# Patient Record
Sex: FEMALE | Race: WHITE | Employment: FULL TIME
[De-identification: names, ages, dates, MRNs, and addresses within clinical notes are randomized per-mention and may not be internally consistent; named-entity substitution may affect disease eponyms.]

---

## 2017-07-29 ENCOUNTER — HEALTH MAINTENANCE LETTER (OUTPATIENT)
Age: 42
End: 2017-07-29

## 2021-05-03 ENCOUNTER — ANCILLARY PROCEDURE (OUTPATIENT)
Dept: GENERAL RADIOLOGY | Facility: CLINIC | Age: 46
End: 2021-05-03
Attending: STUDENT IN AN ORGANIZED HEALTH CARE EDUCATION/TRAINING PROGRAM
Payer: COMMERCIAL

## 2021-05-03 ENCOUNTER — OFFICE VISIT (OUTPATIENT)
Dept: ORTHOPEDICS | Facility: CLINIC | Age: 46
End: 2021-05-03
Payer: COMMERCIAL

## 2021-05-03 VITALS
DIASTOLIC BLOOD PRESSURE: 88 MMHG | SYSTOLIC BLOOD PRESSURE: 131 MMHG | BODY MASS INDEX: 30.63 KG/M2 | WEIGHT: 156 LBS | HEIGHT: 60 IN

## 2021-05-03 DIAGNOSIS — M79.672 PAIN OF LEFT HEEL: ICD-10-CM

## 2021-05-03 DIAGNOSIS — M72.2 PLANTAR FASCIITIS: ICD-10-CM

## 2021-05-03 DIAGNOSIS — M79.672 PAIN OF LEFT HEEL: Primary | ICD-10-CM

## 2021-05-03 PROCEDURE — 73630 X-RAY EXAM OF FOOT: CPT | Mod: LT | Performed by: RADIOLOGY

## 2021-05-03 PROCEDURE — 99203 OFFICE O/P NEW LOW 30 MIN: CPT | Performed by: STUDENT IN AN ORGANIZED HEALTH CARE EDUCATION/TRAINING PROGRAM

## 2021-05-03 SDOH — ECONOMIC STABILITY: INCOME INSECURITY: HOW HARD IS IT FOR YOU TO PAY FOR THE VERY BASICS LIKE FOOD, HOUSING, MEDICAL CARE, AND HEATING?: NOT ASKED

## 2021-05-03 SDOH — ECONOMIC STABILITY: FOOD INSECURITY: WITHIN THE PAST 12 MONTHS, THE FOOD YOU BOUGHT JUST DIDN'T LAST AND YOU DIDN'T HAVE MONEY TO GET MORE.: NOT ASKED

## 2021-05-03 SDOH — ECONOMIC STABILITY: FOOD INSECURITY: WITHIN THE PAST 12 MONTHS, YOU WORRIED THAT YOUR FOOD WOULD RUN OUT BEFORE YOU GOT MONEY TO BUY MORE.: NOT ASKED

## 2021-05-03 SDOH — ECONOMIC STABILITY: TRANSPORTATION INSECURITY
IN THE PAST 12 MONTHS, HAS LACK OF TRANSPORTATION KEPT YOU FROM MEETINGS, WORK, OR FROM GETTING THINGS NEEDED FOR DAILY LIVING?: NOT ASKED

## 2021-05-03 SDOH — ECONOMIC STABILITY: TRANSPORTATION INSECURITY
IN THE PAST 12 MONTHS, HAS THE LACK OF TRANSPORTATION KEPT YOU FROM MEDICAL APPOINTMENTS OR FROM GETTING MEDICATIONS?: NOT ASKED

## 2021-05-03 ASSESSMENT — MIFFLIN-ST. JEOR: SCORE: 1271.17

## 2021-05-03 NOTE — PROGRESS NOTES
"ASSESSMENT & PLAN    1. Pain of left heel  - XR Foot LT G/E 3 vw; Future  2. Plantar fasciitis    Chronic left heel pain consistent with plantar fasciitis.  X-rays obtained today and reviewed with patient, significant only for small plantar calcaneal spur.    Patient Instructions   You have plantar fasciitis in your left foot    Get a plantar fasciitis splint to wear while sleeping    Plantar fascia heel cups (gel pad) to wear in your shoes    Avoid going barefoot at home    Massage the heel with ice (frozen water bottle)    Continue stretches and ibuprofen as needed    Come back to see me in 3-4 weeks to see how you're doing      Jose Quiros MD  [facility] [place]    -----  Chief Complaint   Patient presents with     Left Foot - Pain       SUBJECTIVE  Karen Olvera is a/an 45 year old female who is seen as a self referral for evaluation of left heel pain     The patient is seen by themselves.    Date of Onset: 2 months. Reports insidious onset without acute precipitating event. She states she woke up one day with heel pain.   Location of Pain: left plantar aspect of heel radiating to plantar midfoot and into posterior heel/Achilles insertion.  Worsened by: Pain is worse in the morning, worst after rest, worse at end of work day   Better with: stretching, inserts  Treatments tried: stretching, ice, ibuprofen, inserts  Associated symptoms: throbbing, sharp shooting pain    Orthopedic/Surgical history: NO  Social History/Occupation: Housekeeping - walking all day    No family history pertinent to patient's problem today. She states due to COVID she has been less active and has gained 20 lbs.       OBJECTIVE:  /88   Ht 1.511 m (4' 11.5\")   Wt 70.8 kg (156 lb)   BMI 30.98 kg/m     General: healthy, alert and in no distress  HEENT: no scleral icterus or conjunctival erythema  Skin: no visible suspicious lesions or rashes.   Resp: normal respiratory effort without " conversational dyspnea   Psych: normal mood and affect    MSK:   Left foot without deformity.  Skin intact.  No ecchymosis redness or swelling  Full and symmetric ankle range of motion without pain  Focal tenderness over origin of plantar fascia but both medially and laterally  Pain with passive dorsiflexion of the foot  5/5 strength throughout foot and ankle  SI LT  2+ PT pulse    RADIOLOGY:  Xr Foot Lt G/e 3 Vw    Result Date: 5/3/2021  XR FOOT LEFT G/E 3 VIEWS 5/3/2021 1:17 PM HISTORY: plantar fascia/heel pain; Pain of left heel     IMPRESSION: Small plantar intracranial spur. No evidence of calcaneal erosion. Otherwise unremarkable foot radiographs. TATIANA QUIGLEY MD

## 2021-05-03 NOTE — LETTER
5/3/2021         RE: Karen Olvera  [id number] [facility] [id number]        Dear Colleague,    Thank you for referring your patient, Karen Olvera, to the [facility]. Please see a copy of my visit note below.    ASSESSMENT & PLAN    1. Pain of left heel  - XR Foot LT G/E 3 vw; Future  2. Plantar fasciitis    Chronic left heel pain consistent with plantar fasciitis.  X-rays obtained today and reviewed with patient, significant only for small plantar calcaneal spur.    Patient Instructions   You have plantar fasciitis in your left foot    Get a plantar fasciitis splint to wear while sleeping    Plantar fascia heel cups (gel pad) to wear in your shoes    Avoid going barefoot at home    Massage the heel with ice (frozen water bottle)    Continue stretches and ibuprofen as needed    Come back to see me in 3-4 weeks to see how you're doing      Jose Quiros MD  [facility]    -----  Chief Complaint   Patient presents with     Left Foot - Pain       SUBJECTIVE  Karen Olvera is a/an 45 year old female who is seen as a self referral for evaluation of left heel pain     The patient is seen by themselves.    Date of Onset: 2 months. Reports insidious onset without acute precipitating event. She states she woke up one day with heel pain.   Location of Pain: left plantar aspect of heel radiating to plantar midfoot and into posterior heel/Achilles insertion.  Worsened by: Pain is worse in the morning, worst after rest, worse at end of work day   Better with: stretching, inserts  Treatments tried: stretching, ice, ibuprofen, inserts  Associated symptoms: throbbing, sharp shooting pain    Orthopedic/Surgical history: NO  Social History/Occupation: Housekeeping - walking all day    No family history pertinent to patient's problem today. She states due to COVID she has been less active and has gained 20 lbs.  "      OBJECTIVE:  /88   Ht 1.511 m (4' 11.5\")   Wt 70.8 kg (156 lb)   BMI 30.98 kg/m     General: healthy, alert and in no distress  HEENT: no scleral icterus or conjunctival erythema  Skin: no visible suspicious lesions or rashes.   Resp: normal respiratory effort without conversational dyspnea   Psych: normal mood and affect    MSK:   Left foot without deformity.  Skin intact.  No ecchymosis redness or swelling  Full and symmetric ankle range of motion without pain  Focal tenderness over origin of plantar fascia but both medially and laterally  Pain with passive dorsiflexion of the foot  5/5 strength throughout foot and ankle  SI LT  2+ PT pulse    RADIOLOGY:  Xr Foot Lt G/e 3 Vw    Result Date: 5/3/2021  XR FOOT LEFT G/E 3 VIEWS 5/3/2021 1:17 PM HISTORY: plantar fascia/heel pain; Pain of left heel     IMPRESSION: Small plantar intracranial spur. No evidence of calcaneal erosion. Otherwise unremarkable foot radiographs. TATIANA QUIGLEY MD         Again, thank you for allowing me to participate in the care of your patient.        Sincerely,        Jose Quiros MD    "

## 2021-05-03 NOTE — PATIENT INSTRUCTIONS
You have plantar fasciitis in your left foot    Get a plantar fasciitis splint to wear while sleeping    Plantar fascia heel cups (gel pad) to wear in your shoes    Avoid going barefoot at home    Massage the heel with ice (frozen water bottle)    Continue stretches and ibuprofen as needed    Come back to see me in 3-4 weeks to see how you're doing    Patient Education     Understanding Plantar Fasciitis    Plantar fasciitis is a condition that causes foot and heel pain. The plantar fascia is a tough band of tissue that runs across the bottom of the foot from the heel to the toes. This tissue pulls on the heel bone. It supports the arch of the foot as it pushes off the ground. If the tissue becomes irritated or red and swollen (inflamed), it is called plantar fasciitis.    How to say it  PLAN-tar [id number]   What causes plantar fasciitis?  Plantar fasciitis most often occurs from overusing the plantar fascia. The tissue may become damaged from activities that put repeated stress on the heel and foot. Or it may wear down over time with age and ankle stiffness. You are more likely to have plantar fasciitis if you:     Do activities that require a lot of running, jumping, or dancing    Have new or increase activity    Have a job that requires being on your feet for long periods    Are overweight or obese    Have certain foot problems, such as a tight Achilles tendon, flat feet, or high arches    Often wear poorly fitting shoes  Symptoms of plantar fasciitis  The condition most often causes pain in the heel and the bottom of the foot. The pain may occur when you take your first steps in the morning. It may get better as you walk throughout the day. But as you continue to put weight on the foot, the pain often returns. Pain may also occur after standing or sitting for long periods.   Treating plantar fasciitis  Treatments for plantar fasciitis include:    Resting the foot. This involves limiting movements that make  your foot hurt. You may also need to avoid certain sports and types of work for a time.    Using cold packs. Put an ice pack (wrapped in a thin towel) on the heel and foot to help reduce pain and swelling.    Taking medicines. Prescription and over-the-counter medicines can help relieve pain and swelling. NSAIDs (nonsteroidal anti-inflammatory drugs) are the most common medicines used. They may be given as pills. Or they may be put on the skin as a gel, cream, or patch.    Using heel cups or foot inserts (orthotics). These are placed in the shoes to help support the heel or arch and cushion the heel. You may also be advised to buy proper-fitting shoes with good arch support and cushioned soles.    Taping the foot. This supports the arch and limits the movement of the plantar fascia to help ease symptoms.    Wearing a night splint. This stretches the plantar fascia and leg muscles while you sleep. This may help ease pain.    Doing exercises and physical therapy. These stretch and strengthen the plantar fascia and the muscles in the leg that support the heel and foot. Stretching your calf and plantar fascia is the most effective way to relieve pain.    Getting shots of medicine into the foot. These may help ease symptoms for a time. The shots often contain corticosteroids. These are strong anti-inflammatory medicines.    Having surgery. This may be needed if other treatments fail to relieve symptoms. During surgery, the surgeon may partially cut the plantar fascia to release tension.  Possible complications of plantar fasciitis  Without proper care and treatment, healing may take longer than normal. Also, symptoms may continue or get worse. Over time, the plantar fascia may be damaged. This can make it hard to walk or even stand without pain.   When to call your healthcare provider  Call your healthcare provider right away if you have any of these:    Fever of 100.4 F (38 C) or higher, or as directed by your  provider    Chills    Symptoms that don t get better with treatment, or get worse    New symptoms, such as numbness, tingling, or weakness in the foot  [employer] last reviewed this educational content on 6/1/2019 2000-2021 [employer]. All rights reserved. This information is not intended as a substitute for professional medical care. Always follow your healthcare professional's instructions.

## 2022-01-08 ENCOUNTER — HEALTH MAINTENANCE LETTER (OUTPATIENT)
Age: 47
End: 2022-01-08

## 2022-11-20 ENCOUNTER — HEALTH MAINTENANCE LETTER (OUTPATIENT)
Age: 47
End: 2022-11-20

## 2023-04-15 ENCOUNTER — HEALTH MAINTENANCE LETTER (OUTPATIENT)
Age: 48
End: 2023-04-15